# Patient Record
Sex: MALE | Employment: FULL TIME | URBAN - METROPOLITAN AREA
[De-identification: names, ages, dates, MRNs, and addresses within clinical notes are randomized per-mention and may not be internally consistent; named-entity substitution may affect disease eponyms.]

---

## 2024-03-01 ENCOUNTER — OFFICE VISIT (OUTPATIENT)
Dept: URGENT CARE | Facility: CLINIC | Age: 29
End: 2024-03-01
Payer: OTHER MISCELLANEOUS

## 2024-03-01 VITALS
BODY MASS INDEX: 23.32 KG/M2 | DIASTOLIC BLOOD PRESSURE: 84 MMHG | RESPIRATION RATE: 20 BRPM | WEIGHT: 140 LBS | HEIGHT: 65 IN | TEMPERATURE: 99 F | OXYGEN SATURATION: 98 % | HEART RATE: 84 BPM | SYSTOLIC BLOOD PRESSURE: 145 MMHG

## 2024-03-01 DIAGNOSIS — S02.5XXA CLOSED FRACTURE OF TOOTH, INITIAL ENCOUNTER: ICD-10-CM

## 2024-03-01 DIAGNOSIS — S09.93XA FACIAL TRAUMA, INITIAL ENCOUNTER: Primary | ICD-10-CM

## 2024-03-01 DIAGNOSIS — S00.531A CONTUSION OF LIP, INITIAL ENCOUNTER: ICD-10-CM

## 2024-03-01 DIAGNOSIS — S00.511A LIP ABRASION, INITIAL ENCOUNTER: ICD-10-CM

## 2024-03-01 PROCEDURE — 70100 X-RAY EXAM OF JAW <4VIEWS: CPT | Mod: S$GLB,,, | Performed by: RADIOLOGY

## 2024-03-01 PROCEDURE — 99204 OFFICE O/P NEW MOD 45 MIN: CPT | Mod: S$GLB,,, | Performed by: EMERGENCY MEDICINE

## 2024-03-01 RX ORDER — IBUPROFEN 600 MG/1
600 TABLET ORAL EVERY 6 HOURS PRN
Qty: 20 TABLET | Refills: 0 | Status: SHIPPED | OUTPATIENT
Start: 2024-03-01 | End: 2024-03-06

## 2024-03-01 NOTE — PROGRESS NOTES
Subjective:      Patient ID: Jerry Jennings is a 28 y.o. male.    Chief Complaint: Lip Laceration    Patient's place of employment - [employer]  Patient's job title - Dg  Date of injury - 03/01/24  Body part injured including left or right - Bottom Lip  Injury Mechanism - Hose  What they were doing when they got hurt - Water Hose fell and hit him in the face  What they did immediately after - Reported   Pain scale right now - 7/10    Patient was walking at work and a water hose fell and hit him in the face. Patient is feeling pain in the bottom lip area. Patient has two wound one to the upper lip and one to bottom lip along with some swelling. Patient did not take anything for this injury.    Patient is a 28-year-old male who is a  and states that a hose came loose and struck him in the face.  He sustained trauma to his upper and lower lip without laceration however abrasion and some mild swelling.  He did sustain a fracture of the right mandibular lateral incisor as well as a small chip to the right mandibular central incisor.  There is some dentin exposed however does not appear to have pulse exposed.  There is no laceration of the gingiva and no concern for open fracture.  There is no other loose teeth and no avulsion of the full tooth.  He will need to see Dentistry.  Placed on ibuprofen, over-the-counter Orajel topically for pain as well as dent temp to be gotten at pharmacy.  Will refer to Dentistry today, x-ray of the jaw and mandible is negative for acute fracture.  He will be allowed to work regular duty starting Monday.  His last tetanus was 2 years ago and up-to-date.  Will have him return in 1 week.  No restrictions at work regular duty.    Facial Injury   The incident occurred 6 to 12 hours ago. The injury mechanism was a direct blow. There was no loss of consciousness. There was no blood loss. The quality of the pain is described as throbbing. The pain is at a severity of 7/10. The  pain is moderate. The pain has been intermittent since the injury. Pertinent negatives include no numbness. He has tried nothing for the symptoms. The treatment provided no relief.         ROS    Constitution: Negative for chills and fever.   HENT:  Positive for dental problem and facial trauma. Negative for postnasal drip, sinus pain and sore throat.    Neck: Negative for neck pain and neck stiffness.   Cardiovascular:  Negative for chest pain and palpitations.   Respiratory:  Negative for cough and shortness of breath.    Genitourinary:  Negative for dysuria and hematuria.   Musculoskeletal:  Negative for joint pain.   Skin:  Negative for wound, laceration and erythema.   Neurological:  Negative for altered mental status, numbness and tingling.   Psychiatric/Behavioral:  Negative for altered mental status.      Objective:     Physical Exam  Vitals and nursing note reviewed.   Constitutional:       Appearance: He is well-developed.   HENT:      Head: Normocephalic. No abrasion, contusion or laceration.      Comments: THERE IS NO HEAD TRAUMA HOWEVER FACIAL TRAUMA IS PRESENT WITH ABRASION AND MILD SWELLING OF THE UPPER AND LOWER LIP.  NO LACERATION.     Right Ear: External ear normal.      Left Ear: External ear normal.      Nose: Nose normal.      Mouth/Throat:      Comments: UPPER AND LOWER LIP ABRASION AND CONTUSION WITH MILD SWELLING.  UPPER MAXILLARY TEETH ARE INTACT WITHOUT FRACTURE OR LOOSENESS.      MANDIBULAR DENTAL TRAUMA NOTED WITH VERY SMALL CHIP TO THE MANDIBULAR RIGHT CENTRAL INCISOR.  THERE IS A NEAR 30-40% FRACTURE OF THE RIGHT MANDIBULAR LATERAL INCISOR WITH WHAT APPEARS TO BE DENTIN EXPOSED BUT NOT PULP  MOST LIKELY VILLAREAL CLASS 2 FRACTURE.  WILL BE REFERRED TO DENTISTRY.  NO GINGIVAL DISRUPTION AND NO BLEEDING OF THE GUMS.  THERE IS NO MALOCCLUSION AND ABLE TO BREAK TONGUE BLADE.  Eyes:      General: Lids are normal.      Conjunctiva/sclera: Conjunctivae normal.      Pupils: Pupils are equal, round,  and reactive to light.   Neck:      Trachea: Trachea and phonation normal.   Cardiovascular:      Rate and Rhythm: Normal rate and regular rhythm.      Heart sounds: Normal heart sounds.   Pulmonary:      Effort: Pulmonary effort is normal. No respiratory distress.      Breath sounds: Normal breath sounds. No stridor.   Musculoskeletal:         General: Normal range of motion.      Cervical back: Full passive range of motion without pain and neck supple.   Skin:     General: Skin is warm and dry.      Capillary Refill: Capillary refill takes less than 2 seconds.      Findings: No abrasion, bruising, burn, ecchymosis, erythema, laceration, lesion or rash.   Neurological:      Mental Status: He is alert and oriented to person, place, and time.   Psychiatric:         Speech: Speech normal.         Behavior: Behavior normal.         Thought Content: Thought content normal.         Judgment: Judgment normal.      X-RAY OF THE MANDIBLE SHOWS NO ACUTE FRACTURE OBVIOUS BONY INJURY.  DOES SHOW EVIDENCE OF DENTAL FRACTURE    Assessment:      1. Facial trauma, initial encounter    2. Closed fracture of tooth, initial encounter    3. Lip abrasion, initial encounter    4. Contusion of lip, initial encounter      Plan:       Patient is a 28-year-old male who is a  and states that a hose came loose and struck him in the face.  He sustained trauma to his upper and lower lip without laceration however abrasion and some mild swelling.  He did sustain a fracture of the right mandibular lateral incisor as well as a small chip to the right mandibular central incisor.  There is some dentin exposed however does not appear to have pulse exposed.  There is no laceration of the gingiva and no concern for open fracture.  There is no other loose teeth and no avulsion of the full tooth.  He will need to see Dentistry.  Placed on ibuprofen, over-the-counter Orajel topically for pain as well as dent temp to be gotten at pharmacy.  Will  refer to Dentistry today, x-ray of the jaw and mandible is negative for acute fracture.  He will be allowed to work regular duty starting Monday.  His last tetanus was 2 years ago and up-to-date.  Will have him return in 1 week.  No restrictions at work regular duty.    Medications Ordered This Encounter   Medications    ibuprofen (ADVIL,MOTRIN) 600 MG tablet     Sig: Take 1 tablet (600 mg total) by mouth every 6 (six) hours as needed for Pain or Temperature greater than (100.4).     Dispense:  20 tablet     Refill:  0     Patient Instructions: Attention not to aggravate affected area, Referral to specialist to be scheduled, once authorized (USE OVER-THE-COUNTER ORAJEL AS WELL AS AS DISCUSSED DENT TEMP OR TEMPORIN THAT CAN BE FOUND AT DRUG STORE OR PHARMACY.)   Restrictions: Regular Duty  Follow up in about 1 week (around 3/8/2024).

## 2024-03-01 NOTE — LETTER
[facility] [place] [address] [facility], [address]  [place] [address]  Phone: [phone]  Fax: [phone]  [facility]: [phone]    Pt Name: Jerry Jennings  Injury Date: 03/01/2024   Employee ID: [id number] Date of First Treatment: 03/01/2024   Company: [id number]      Appointment Time: 08:25 AM Arrived: 9:85 am   Provider: Lenin Beatty MD Time Out:10:30 am     Office Treatment:   1. Facial trauma, initial encounter    2. Closed fracture of tooth, initial encounter    3. Lip abrasion, initial encounter    4. Contusion of lip, initial encounter      Medications Ordered This Encounter   Medications    ibuprofen (ADVIL,MOTRIN) 600 MG tablet      Patient Instructions: Attention not to aggravate affected area, Referral to specialist to be scheduled, once authorized (USE OVER-THE-COUNTER ORAJEL AS WELL AS AS DISCUSSED DENT TEMP OR TEMPORIN THAT CAN BE FOUND AT DRUG STORE OR PHARMACY.)    Restrictions: Regular Duty     Return Appointment: 3/8/2024 at 9:00 am  SW

## 2024-03-08 ENCOUNTER — TELEPHONE (OUTPATIENT)
Dept: URGENT CARE | Facility: CLINIC | Age: 29
End: 2024-03-08
Payer: OTHER MISCELLANEOUS

## 2024-03-11 ENCOUNTER — TELEPHONE (OUTPATIENT)
Dept: URGENT CARE | Facility: CLINIC | Age: 29
End: 2024-03-11
Payer: OTHER MISCELLANEOUS

## 2024-03-11 NOTE — TELEPHONE ENCOUNTER
Called patient and left a voice message and call back number regarding the missed [facility] Appointment.  AFG